# Patient Record
Sex: FEMALE | Race: WHITE
[De-identification: names, ages, dates, MRNs, and addresses within clinical notes are randomized per-mention and may not be internally consistent; named-entity substitution may affect disease eponyms.]

---

## 2017-01-23 ENCOUNTER — HOSPITAL ENCOUNTER (OUTPATIENT)
Dept: HOSPITAL 9 - MADLAB | Age: 72
Discharge: HOME | End: 2017-01-23
Payer: MEDICARE

## 2017-01-23 DIAGNOSIS — E55.9: ICD-10-CM

## 2017-01-23 DIAGNOSIS — E03.9: ICD-10-CM

## 2017-01-23 DIAGNOSIS — E78.5: Primary | ICD-10-CM

## 2017-01-23 DIAGNOSIS — E11.9: ICD-10-CM

## 2017-01-23 LAB
ALBUMIN SERPL BCG-MCNC: 3.9 G/DL (ref 3.4–4.8)
ALP SERPL-CCNC: 114 U/L (ref 40–150)
ALT SERPL W P-5'-P-CCNC: 13 U/L (ref 0–55)
ANION GAP SERPL CALC-SCNC: 21 MMOL/L (ref 10–20)
AST SERPL-CCNC: 16 U/L (ref 5–34)
BILIRUB SERPL-MCNC: 0.9 MG/DL (ref 0.2–1.2)
BUN SERPL-MCNC: 16 MG/DL (ref 9.8–20.1)
CALCIUM SERPL-MCNC: 9.3 MG/DL (ref 7.8–10.44)
CHLORIDE SERPL-SCNC: 100 MMOL/L (ref 98–107)
CO2 SERPL-SCNC: 25 MMOL/L (ref 23–31)
CREAT CL PREDICTED SERPL C-G-VRATE: 0 ML/MIN (ref 70–130)
GLOBULIN SER CALC-MCNC: 3.1 G/DL (ref 2.4–3.5)
GLUCOSE SERPL-MCNC: 165 MG/DL (ref 80–115)
HGB BLD-MCNC: 16.6 G/DL (ref 12–16)
MCH RBC QN AUTO: 30.9 PG (ref 27–31)
MCV RBC AUTO: 91.4 FL (ref 81–99)
PLATELET # BLD AUTO: 133 THOU/UL (ref 130–400)
POTASSIUM SERPL-SCNC: 3.7 MMOL/L (ref 3.5–5.1)
RBC # BLD AUTO: 5.36 MILL/UL (ref 4.2–5.4)
SODIUM SERPL-SCNC: 142 MMOL/L (ref 136–145)
WBC # BLD AUTO: 6.3 THOU/UL (ref 4.8–10.8)

## 2017-01-23 PROCEDURE — 82306 VITAMIN D 25 HYDROXY: CPT

## 2017-01-23 PROCEDURE — 85027 COMPLETE CBC AUTOMATED: CPT

## 2017-01-23 PROCEDURE — 83036 HEMOGLOBIN GLYCOSYLATED A1C: CPT

## 2017-01-23 PROCEDURE — 80053 COMPREHEN METABOLIC PANEL: CPT

## 2017-01-23 PROCEDURE — 36415 COLL VENOUS BLD VENIPUNCTURE: CPT

## 2017-04-27 ENCOUNTER — HOSPITAL ENCOUNTER (OUTPATIENT)
Dept: HOSPITAL 9 - MADLAB | Age: 72
Discharge: HOME | End: 2017-04-27
Payer: MEDICARE

## 2017-04-27 DIAGNOSIS — I10: ICD-10-CM

## 2017-04-27 DIAGNOSIS — E11.9: ICD-10-CM

## 2017-04-27 DIAGNOSIS — E03.9: ICD-10-CM

## 2017-04-27 DIAGNOSIS — E78.5: Primary | ICD-10-CM

## 2017-04-27 LAB
ALBUMIN SERPL BCG-MCNC: 3.6 G/DL (ref 3.4–4.8)
ALP SERPL-CCNC: 157 U/L (ref 40–150)
ALT SERPL W P-5'-P-CCNC: 16 U/L (ref 0–55)
ANION GAP SERPL CALC-SCNC: 13 MMOL/L (ref 10–20)
AST SERPL-CCNC: 16 U/L (ref 5–34)
BILIRUB SERPL-MCNC: 0.7 MG/DL (ref 0.2–1.2)
BUN SERPL-MCNC: 18 MG/DL (ref 9.8–20.1)
CALCIUM SERPL-MCNC: 9.3 MG/DL (ref 7.8–10.44)
CHD RISK SERPL-RTO: 5.4 (ref ?–4.5)
CHLORIDE SERPL-SCNC: 102 MMOL/L (ref 98–107)
CHOLEST SERPL-MCNC: 234 MG/DL
CO2 SERPL-SCNC: 31 MMOL/L (ref 23–31)
CREAT CL PREDICTED SERPL C-G-VRATE: 0 ML/MIN (ref 70–130)
GLOBULIN SER CALC-MCNC: 2.8 G/DL (ref 2.4–3.5)
GLUCOSE SERPL-MCNC: 164 MG/DL (ref 80–115)
HDLC SERPL-MCNC: 43 MG/DL
LDLC SERPL CALC-MCNC: 154 MG/DL
POTASSIUM SERPL-SCNC: 3.6 MMOL/L (ref 3.5–5.1)
SODIUM SERPL-SCNC: 142 MMOL/L (ref 136–145)
T4 FREE SERPL-MCNC: 1.18 NG/DL (ref 0.7–1.48)
TRIGL SERPL-MCNC: 186 MG/DL (ref ?–150)
TSH SERPL DL<=0.005 MIU/L-ACNC: 1.57 UIU/ML (ref 0.35–4.94)

## 2017-04-27 PROCEDURE — 84439 ASSAY OF FREE THYROXINE: CPT

## 2017-04-27 PROCEDURE — 84443 ASSAY THYROID STIM HORMONE: CPT

## 2017-04-27 PROCEDURE — 80053 COMPREHEN METABOLIC PANEL: CPT

## 2017-04-27 PROCEDURE — 80061 LIPID PANEL: CPT

## 2017-04-27 PROCEDURE — 36415 COLL VENOUS BLD VENIPUNCTURE: CPT

## 2018-09-20 ENCOUNTER — HOSPITAL ENCOUNTER (OUTPATIENT)
Dept: HOSPITAL 92 - BICMAMMO | Age: 73
Discharge: HOME | End: 2018-09-20
Payer: MEDICARE

## 2018-09-20 DIAGNOSIS — Z12.31: Primary | ICD-10-CM

## 2018-09-20 PROCEDURE — 77063 BREAST TOMOSYNTHESIS BI: CPT

## 2018-09-20 PROCEDURE — 77067 SCR MAMMO BI INCL CAD: CPT

## 2019-03-03 ENCOUNTER — HOSPITAL ENCOUNTER (EMERGENCY)
Dept: HOSPITAL 9 - MADERS | Age: 74
Discharge: HOME | End: 2019-03-03
Payer: MEDICARE

## 2019-03-03 DIAGNOSIS — E11.65: Primary | ICD-10-CM

## 2019-03-03 DIAGNOSIS — R10.9: ICD-10-CM

## 2019-03-03 DIAGNOSIS — Z86.718: ICD-10-CM

## 2019-03-03 DIAGNOSIS — Z79.01: ICD-10-CM

## 2019-03-03 DIAGNOSIS — Z79.899: ICD-10-CM

## 2019-03-03 DIAGNOSIS — E78.00: ICD-10-CM

## 2019-03-03 DIAGNOSIS — E03.9: ICD-10-CM

## 2019-03-03 DIAGNOSIS — I10: ICD-10-CM

## 2019-03-03 DIAGNOSIS — Z79.84: ICD-10-CM

## 2019-03-03 LAB
ALBUMIN SERPL BCG-MCNC: 3.7 G/DL (ref 3.4–4.8)
ALP SERPL-CCNC: 124 U/L (ref 40–150)
ALT SERPL W P-5'-P-CCNC: 29 U/L (ref 8–55)
ANION GAP SERPL CALC-SCNC: 14 MMOL/L (ref 10–20)
APTT PPP: 31.6 SEC (ref 22.9–36.1)
AST SERPL-CCNC: 20 U/L (ref 5–34)
BASOPHILS # BLD AUTO: 0.1 THOU/UL (ref 0–0.2)
BASOPHILS NFR BLD AUTO: 0.9 % (ref 0–1)
BILIRUB SERPL-MCNC: 0.6 MG/DL (ref 0.2–1.2)
BUN SERPL-MCNC: 19 MG/DL (ref 9.8–20.1)
CALCIUM SERPL-MCNC: 9 MG/DL (ref 7.8–10.44)
CHLORIDE SERPL-SCNC: 103 MMOL/L (ref 98–107)
CO2 SERPL-SCNC: 28 MMOL/L (ref 23–31)
CREAT CL PREDICTED SERPL C-G-VRATE: 0 ML/MIN (ref 70–130)
EOSINOPHIL # BLD AUTO: 0.2 THOU/UL (ref 0–0.7)
EOSINOPHIL NFR BLD AUTO: 2.4 % (ref 0–10)
GLOBULIN SER CALC-MCNC: 2.7 G/DL (ref 2.4–3.5)
GLUCOSE SERPL-MCNC: 299 MG/DL (ref 83–110)
GLUCOSE UR STRIP-MCNC: 100 MG/DL
HGB BLD-MCNC: 14.4 G/DL (ref 12–16)
INR PPP: 2
LIPASE SERPL-CCNC: 8 U/L (ref 8–78)
LYMPHOCYTES # BLD AUTO: 1.8 THOU/UL (ref 1.2–3.4)
LYMPHOCYTES NFR BLD AUTO: 28.9 % (ref 21–51)
MCH RBC QN AUTO: 31.9 PG (ref 27–31)
MCV RBC AUTO: 93.9 FL (ref 78–98)
MONOCYTES # BLD AUTO: 0.5 THOU/UL (ref 0.11–0.59)
MONOCYTES NFR BLD AUTO: 8 % (ref 0–10)
NEUTROPHILS # BLD AUTO: 3.7 THOU/UL (ref 1.4–6.5)
NEUTROPHILS NFR BLD AUTO: 59.8 % (ref 42–75)
PLATELET # BLD AUTO: 161 THOU/UL (ref 130–400)
POTASSIUM SERPL-SCNC: 3.7 MMOL/L (ref 3.5–5.1)
PROTHROMBIN TIME: 22.9 SEC (ref 12–14.7)
RBC # BLD AUTO: 4.52 MILL/UL (ref 4.2–5.4)
SODIUM SERPL-SCNC: 141 MMOL/L (ref 136–145)
SP GR UR STRIP: 1.02 (ref 1–1.03)
WBC # BLD AUTO: 6.2 THOU/UL (ref 4.8–10.8)

## 2019-03-03 PROCEDURE — 81003 URINALYSIS AUTO W/O SCOPE: CPT

## 2019-03-03 PROCEDURE — 83690 ASSAY OF LIPASE: CPT

## 2019-03-03 PROCEDURE — 84484 ASSAY OF TROPONIN QUANT: CPT

## 2019-03-03 PROCEDURE — 93005 ELECTROCARDIOGRAM TRACING: CPT

## 2019-03-03 PROCEDURE — 80053 COMPREHEN METABOLIC PANEL: CPT

## 2019-03-03 PROCEDURE — 85730 THROMBOPLASTIN TIME PARTIAL: CPT

## 2019-03-03 PROCEDURE — 85610 PROTHROMBIN TIME: CPT

## 2019-03-03 PROCEDURE — 85025 COMPLETE CBC W/AUTO DIFF WBC: CPT

## 2019-03-03 PROCEDURE — 74177 CT ABD & PELVIS W/CONTRAST: CPT

## 2019-03-03 NOTE — CT
CT ABDOMEN AND PELVIS WITH IV CONTRAST:

 

Date:  03/03/19 

 

HISTORY:  

Right lower quadrant pain. 

 

COMPARISON:  

03/02/16. 

 

FINDINGS:

Lung bases are clear. The liver, spleen, kidneys, adrenal glands, and pancreas have a normal CT appea
shauna. No enlarged lymph nodes or free fluid apparent. Urinary bladder is incompletely distended. The
re are degenerative changes of the lumbar spine. Appendix not well visualized. No evidence of inflamm
ation. 

 

Subtle peripherally dense object is noted within the gallbladder lumen. 

 

IMPRESSION: 

1.  Cholelithiasis. 

2.  No significant abnormalities otherwise demonstrated. 

 

 

POS: [facility]

## 2019-03-29 ENCOUNTER — HOSPITAL ENCOUNTER (OUTPATIENT)
Dept: HOSPITAL 92 - BICMRI | Age: 74
Discharge: HOME | End: 2019-03-29
Payer: MEDICARE

## 2019-03-29 DIAGNOSIS — R10.10: ICD-10-CM

## 2019-03-29 DIAGNOSIS — N28.1: ICD-10-CM

## 2019-03-29 DIAGNOSIS — R10.31: Primary | ICD-10-CM

## 2019-03-29 DIAGNOSIS — K80.20: ICD-10-CM

## 2019-03-29 PROCEDURE — 74183 MRI ABD W/O CNTR FLWD CNTR: CPT

## 2019-03-29 PROCEDURE — A9577 INJ MULTIHANCE: HCPCS

## 2019-03-29 NOTE — MRI
MRI OF THE ABDOMEN WITH AND WITHOUT CONTRAST:

 

INDICATION: 

History of right lower quadrant abdominal pain.

 

CONTRAST:

19 cc of MultiHance.

 

COMPARISON: 

CT of the abdomen and pelvis dated 3/3/2019. 

 

FINDINGS: 

No focal hepatic lesion is evident.  No abnormal enhancement is demonstrated.  

 

There is a gallstone within the gallbladder. 

 

There is a 1 cm cyst involving the superior pole of the right kidney.  The left kidney is normal appe
aring.  The adrenal glands, pancreas, and spleen appear within normal limits.  

 

No free fluid is evident.  No bone marrow signal abnormality is evident.

 

IMPRESSION: 

1.  Cholelithiasis.

 

2.  Small right renal cyst.

 

3.  No additional suspicious abnormality.

 

POS: [facility]

## 2019-09-24 ENCOUNTER — HOSPITAL ENCOUNTER (OUTPATIENT)
Dept: HOSPITAL 92 - BICMAMMO | Age: 74
Discharge: HOME | End: 2019-09-24
Payer: MEDICARE

## 2019-09-24 DIAGNOSIS — Z12.31: Primary | ICD-10-CM

## 2019-09-24 PROCEDURE — 77067 SCR MAMMO BI INCL CAD: CPT

## 2019-09-24 PROCEDURE — 77063 BREAST TOMOSYNTHESIS BI: CPT

## 2019-09-24 NOTE — MMO
Bilateral MAMMO Bilat Screen [facility].

 

CLINICAL HISTORY:

Patient is 74 years old and is seen for screening. The patient has no family

history of breast cancer.  The patient has no personal history of cancer.

 

VIEWS:

The views performed were:  bilateral craniocaudal with tomosynthesis and

bilateral mediolateral oblique with tomosynthesis.

 

FILMS COMPARED:

The present examination has been compared to a prior imaging study performed at

[facility] on 09/20/2018.

 

This study has been interpreted with the assistance of computer-aided detection.

 

MAMMOGRAM FINDINGS:

The breasts are almost entirely fat.

 

There are stable benign appearing calcifications seen in both breasts.

 

There are no suspicious masses, suspicious calcifications, or new areas of

architectural distortion.

 

IMPRESSION:

THERE IS NO MAMMOGRAPHIC EVIDENCE OF MALIGNANCY.

 

A ROUTINE FOLLOW-UP MAMMOGRAM IN 1 YEAR IS RECOMMENDED.

 

THE RESULTS OF THIS EXAM WERE SENT TO THE PATIENT.

 

ACR BI-RADS Category 2 - Benign finding

 

MAMMOGRAPHY NOTE:

 1. A negative mammogram report should not delay a biopsy if a dominant of

 clinically suspicious mass is present.

 2. Approximately 10% to 15% of breast cancers are not detected by

 mammography.

 3. Adenosis and dense breasts may obscure an underlying neoplasm.

 

 

Reported by: DC SOLANO MD

Electonically Signed: [id number]

## 2019-11-04 ENCOUNTER — HOSPITAL ENCOUNTER (OUTPATIENT)
Dept: HOSPITAL 9 - MADLABBHPM | Age: 74
Discharge: HOME | End: 2019-11-04
Attending: FAMILY MEDICINE
Payer: MEDICARE

## 2019-11-04 DIAGNOSIS — I82.402: ICD-10-CM

## 2019-11-04 DIAGNOSIS — Z79.01: ICD-10-CM

## 2019-11-04 DIAGNOSIS — Z51.81: Primary | ICD-10-CM

## 2019-11-04 LAB
INR PPP: 1.8
PROTHROMBIN TIME: 20.9 SEC (ref 12–14.7)

## 2019-11-04 PROCEDURE — 85610 PROTHROMBIN TIME: CPT

## 2019-11-04 PROCEDURE — 36415 COLL VENOUS BLD VENIPUNCTURE: CPT

## 2019-12-10 ENCOUNTER — HOSPITAL ENCOUNTER (OUTPATIENT)
Dept: HOSPITAL 9 - MADLABBHPM | Age: 74
Discharge: HOME | End: 2019-12-10
Attending: FAMILY MEDICINE
Payer: MEDICARE

## 2019-12-10 DIAGNOSIS — I82.402: ICD-10-CM

## 2019-12-10 DIAGNOSIS — Z51.81: Primary | ICD-10-CM

## 2019-12-10 DIAGNOSIS — Z79.01: ICD-10-CM

## 2019-12-10 LAB
INR PPP: 4.5
PROTHROMBIN TIME: 42.1 SEC (ref 12–14.7)

## 2019-12-10 PROCEDURE — 85610 PROTHROMBIN TIME: CPT

## 2019-12-10 PROCEDURE — 36415 COLL VENOUS BLD VENIPUNCTURE: CPT

## 2019-12-16 ENCOUNTER — HOSPITAL ENCOUNTER (OUTPATIENT)
Dept: HOSPITAL 9 - MADLABBHPM | Age: 74
Discharge: HOME | End: 2019-12-16
Attending: FAMILY MEDICINE
Payer: MEDICARE

## 2019-12-16 DIAGNOSIS — Z79.01: ICD-10-CM

## 2019-12-16 DIAGNOSIS — I82.402: Primary | ICD-10-CM

## 2019-12-16 LAB
INR PPP: 1.8
PROTHROMBIN TIME: 20.8 SEC (ref 12–14.7)

## 2019-12-16 PROCEDURE — 85610 PROTHROMBIN TIME: CPT

## 2019-12-16 PROCEDURE — 36415 COLL VENOUS BLD VENIPUNCTURE: CPT

## 2020-01-03 ENCOUNTER — HOSPITAL ENCOUNTER (OUTPATIENT)
Dept: HOSPITAL 9 - MADLABBHPM | Age: 75
Discharge: HOME | End: 2020-01-03
Attending: FAMILY MEDICINE
Payer: MEDICARE

## 2020-01-03 DIAGNOSIS — Z79.01: ICD-10-CM

## 2020-01-03 DIAGNOSIS — I82.402: ICD-10-CM

## 2020-01-03 DIAGNOSIS — Z51.81: Primary | ICD-10-CM

## 2020-01-03 LAB
INR PPP: 2
PROTHROMBIN TIME: 22.6 SEC (ref 12–14.7)

## 2020-01-03 PROCEDURE — 36415 COLL VENOUS BLD VENIPUNCTURE: CPT

## 2020-01-03 PROCEDURE — 85610 PROTHROMBIN TIME: CPT

## 2020-01-30 ENCOUNTER — HOSPITAL ENCOUNTER (OUTPATIENT)
Dept: HOSPITAL 9 - MADLABBHPM | Age: 75
Discharge: HOME | End: 2020-01-30
Attending: FAMILY MEDICINE
Payer: MEDICARE

## 2020-01-30 DIAGNOSIS — E78.5: ICD-10-CM

## 2020-01-30 DIAGNOSIS — E11.9: Primary | ICD-10-CM

## 2020-01-30 LAB
ALBUMIN SERPL BCG-MCNC: 3.9 G/DL (ref 3.4–4.8)
ALP SERPL-CCNC: 127 U/L (ref 40–110)
ALT SERPL W P-5'-P-CCNC: 24 U/L (ref 8–55)
ANION GAP SERPL CALC-SCNC: 16 MMOL/L (ref 10–20)
AST SERPL-CCNC: 19 U/L (ref 5–34)
BILIRUB DIRECT SERPL-MCNC: 0.3 MG/DL (ref 0.1–0.3)
BILIRUB SERPL-MCNC: 1 MG/DL (ref 0.2–1.2)
BUN SERPL-MCNC: 20 MG/DL (ref 9.8–20.1)
CALCIUM SERPL-MCNC: 9.4 MG/DL (ref 7.8–10.44)
CHD RISK SERPL-RTO: 2.9 (ref ?–4.5)
CHLORIDE SERPL-SCNC: 101 MMOL/L (ref 98–107)
CHOLEST SERPL-MCNC: 156 MG/DL
CO2 SERPL-SCNC: 29 MMOL/L (ref 23–31)
CREAT CL PREDICTED SERPL C-G-VRATE: 0 ML/MIN (ref 70–130)
GLUCOSE SERPL-MCNC: 274 MG/DL (ref 83–110)
HDLC SERPL-MCNC: 54 MG/DL
INR PPP: 1.3
LDLC SERPL CALC-MCNC: 60 MG/DL
POTASSIUM SERPL-SCNC: 3.5 MMOL/L (ref 3.5–5.1)
PROTHROMBIN TIME: 16.2 SEC (ref 12–14.7)
SODIUM SERPL-SCNC: 142 MMOL/L (ref 136–145)
TRIGL SERPL-MCNC: 208 MG/DL (ref ?–150)

## 2020-01-30 PROCEDURE — 36415 COLL VENOUS BLD VENIPUNCTURE: CPT

## 2020-01-30 PROCEDURE — 80048 BASIC METABOLIC PNL TOTAL CA: CPT

## 2020-01-30 PROCEDURE — 80076 HEPATIC FUNCTION PANEL: CPT

## 2020-01-30 PROCEDURE — 83036 HEMOGLOBIN GLYCOSYLATED A1C: CPT

## 2020-01-30 PROCEDURE — 85610 PROTHROMBIN TIME: CPT

## 2020-01-30 PROCEDURE — 80061 LIPID PANEL: CPT

## 2020-03-02 ENCOUNTER — HOSPITAL ENCOUNTER (OUTPATIENT)
Dept: HOSPITAL 9 - MADLAB | Age: 75
Discharge: HOME | End: 2020-03-02
Attending: FAMILY MEDICINE
Payer: MEDICARE

## 2020-03-02 DIAGNOSIS — I82.402: Primary | ICD-10-CM

## 2020-03-02 LAB
INR PPP: 2.1
PROTHROMBIN TIME: 23.1 SEC (ref 12–14.7)

## 2020-03-02 PROCEDURE — 36415 COLL VENOUS BLD VENIPUNCTURE: CPT

## 2020-03-02 PROCEDURE — 85610 PROTHROMBIN TIME: CPT

## 2020-05-27 ENCOUNTER — HOSPITAL ENCOUNTER (OUTPATIENT)
Dept: HOSPITAL 9 - MADLAB | Age: 75
Discharge: HOME | End: 2020-05-27
Attending: FAMILY MEDICINE
Payer: MEDICARE

## 2020-05-27 DIAGNOSIS — Z79.01: ICD-10-CM

## 2020-05-27 DIAGNOSIS — I82.402: ICD-10-CM

## 2020-05-27 DIAGNOSIS — Z51.81: Primary | ICD-10-CM

## 2020-05-27 LAB
INR PPP: 1.8
PROTHROMBIN TIME: 20.9 SEC (ref 12–14.7)

## 2020-05-27 PROCEDURE — 85610 PROTHROMBIN TIME: CPT

## 2020-05-27 PROCEDURE — 36415 COLL VENOUS BLD VENIPUNCTURE: CPT

## 2020-06-10 ENCOUNTER — HOSPITAL ENCOUNTER (OUTPATIENT)
Dept: HOSPITAL 9 - MADLAB | Age: 75
Discharge: HOME | End: 2020-06-10
Attending: FAMILY MEDICINE
Payer: MEDICARE

## 2020-06-10 DIAGNOSIS — Z51.81: Primary | ICD-10-CM

## 2020-06-10 DIAGNOSIS — I82.402: ICD-10-CM

## 2020-06-10 DIAGNOSIS — Z79.01: ICD-10-CM

## 2020-06-10 LAB
INR PPP: 1.8
PROTHROMBIN TIME: 20.4 SEC (ref 12–14.7)

## 2020-06-10 PROCEDURE — 36415 COLL VENOUS BLD VENIPUNCTURE: CPT

## 2020-06-10 PROCEDURE — 85610 PROTHROMBIN TIME: CPT

## 2020-06-23 ENCOUNTER — HOSPITAL ENCOUNTER (OUTPATIENT)
Dept: HOSPITAL 9 - MADLAB | Age: 75
Discharge: HOME | End: 2020-06-23
Attending: FAMILY MEDICINE
Payer: MEDICARE

## 2020-06-23 DIAGNOSIS — Z79.01: ICD-10-CM

## 2020-06-23 DIAGNOSIS — I82.402: ICD-10-CM

## 2020-06-23 DIAGNOSIS — Z51.81: Primary | ICD-10-CM

## 2020-06-23 LAB
INR PPP: 2.5
PROTHROMBIN TIME: 27.2 SEC (ref 12–14.7)

## 2020-06-23 PROCEDURE — 85610 PROTHROMBIN TIME: CPT

## 2020-06-23 PROCEDURE — 36415 COLL VENOUS BLD VENIPUNCTURE: CPT

## 2020-09-11 ENCOUNTER — HOSPITAL ENCOUNTER (OUTPATIENT)
Dept: HOSPITAL 9 - MADLAB | Age: 75
Discharge: HOME | End: 2020-09-11
Attending: FAMILY MEDICINE
Payer: MEDICARE

## 2020-09-11 DIAGNOSIS — Z79.01: ICD-10-CM

## 2020-09-11 DIAGNOSIS — I82.402: ICD-10-CM

## 2020-09-11 DIAGNOSIS — Z51.81: Primary | ICD-10-CM

## 2020-09-11 LAB
INR PPP: 1.5
PROTHROMBIN TIME: 17.7 SEC (ref 12–14.7)

## 2020-09-11 PROCEDURE — 85610 PROTHROMBIN TIME: CPT

## 2020-09-11 PROCEDURE — 36415 COLL VENOUS BLD VENIPUNCTURE: CPT

## 2021-10-15 ENCOUNTER — HOSPITAL ENCOUNTER (OUTPATIENT)
Dept: HOSPITAL 92 - BICMAMMO | Age: 76
Discharge: HOME | End: 2021-10-15
Attending: FAMILY MEDICINE
Payer: MEDICARE

## 2021-10-15 DIAGNOSIS — Z12.31: Primary | ICD-10-CM

## 2021-10-15 DIAGNOSIS — Z80.3: ICD-10-CM

## 2021-10-15 PROCEDURE — 77063 BREAST TOMOSYNTHESIS BI: CPT

## 2021-10-15 PROCEDURE — 77067 SCR MAMMO BI INCL CAD: CPT

## 2022-10-18 ENCOUNTER — HOSPITAL ENCOUNTER (OUTPATIENT)
Dept: HOSPITAL 92 - BICMAMMO | Age: 77
Discharge: HOME | End: 2022-10-18
Attending: FAMILY MEDICINE
Payer: MEDICARE

## 2022-10-18 DIAGNOSIS — Z80.3: ICD-10-CM

## 2022-10-18 DIAGNOSIS — Z12.31: Primary | ICD-10-CM

## 2022-10-18 PROCEDURE — 77063 BREAST TOMOSYNTHESIS BI: CPT

## 2022-10-18 PROCEDURE — 77067 SCR MAMMO BI INCL CAD: CPT

## 2022-12-20 ENCOUNTER — HOSPITAL ENCOUNTER (OUTPATIENT)
Dept: HOSPITAL 92 - MRI | Age: 77
Discharge: HOME | End: 2022-12-20
Attending: NEUROLOGICAL SURGERY
Payer: MEDICARE

## 2022-12-20 DIAGNOSIS — M54.50: ICD-10-CM

## 2022-12-20 DIAGNOSIS — M47.897: Primary | ICD-10-CM

## 2022-12-20 PROCEDURE — 72148 MRI LUMBAR SPINE W/O DYE: CPT

## 2023-10-19 ENCOUNTER — HOSPITAL ENCOUNTER (EMERGENCY)
Dept: HOSPITAL 9 - MADERS | Age: 78
Discharge: HOME | End: 2023-10-19
Payer: MEDICARE

## 2023-10-19 DIAGNOSIS — S40.861A: Primary | ICD-10-CM

## 2023-10-19 DIAGNOSIS — Z79.899: ICD-10-CM

## 2023-10-19 DIAGNOSIS — Z79.4: ICD-10-CM

## 2023-10-19 DIAGNOSIS — I10: ICD-10-CM

## 2023-10-19 DIAGNOSIS — E11.9: ICD-10-CM

## 2023-10-19 DIAGNOSIS — Z79.84: ICD-10-CM

## 2023-10-19 DIAGNOSIS — E03.9: ICD-10-CM
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
## 2023-10-19 DIAGNOSIS — Z86.718: ICD-10-CM

## 2023-10-19 DIAGNOSIS — E78.00: ICD-10-CM

## 2023-10-19 PROCEDURE — 99283 EMERGENCY DEPT VISIT LOW MDM: CPT

## 2024-09-20 ENCOUNTER — HOSPITAL ENCOUNTER (EMERGENCY)
Dept: HOSPITAL 9 - MADERS | Age: 79
Discharge: HOME | End: 2024-09-20
Payer: MEDICARE

## 2024-09-20 DIAGNOSIS — B35.1: Primary | ICD-10-CM

## 2024-09-20 PROCEDURE — 99283 EMERGENCY DEPT VISIT LOW MDM: CPT
